# Patient Record
Sex: MALE | Race: WHITE | NOT HISPANIC OR LATINO | URBAN - METROPOLITAN AREA
[De-identification: names, ages, dates, MRNs, and addresses within clinical notes are randomized per-mention and may not be internally consistent; named-entity substitution may affect disease eponyms.]

---

## 2024-03-28 ENCOUNTER — OFFICE VISIT (OUTPATIENT)
Dept: PRIMARY CARE | Facility: CLINIC | Age: 24
End: 2024-03-28
Payer: COMMERCIAL

## 2024-03-28 VITALS
SYSTOLIC BLOOD PRESSURE: 134 MMHG | BODY MASS INDEX: 40.79 KG/M2 | HEIGHT: 69 IN | DIASTOLIC BLOOD PRESSURE: 60 MMHG | WEIGHT: 275.4 LBS

## 2024-03-28 DIAGNOSIS — L60.0 INGROWN NAIL OF GREAT TOE: ICD-10-CM

## 2024-03-28 PROBLEM — S61.219A LACERATION OF FINGER: Status: ACTIVE | Noted: 2024-03-28

## 2024-03-28 PROBLEM — H60.311 ACUTE DIFFUSE OTITIS EXTERNA OF RIGHT EAR: Status: ACTIVE | Noted: 2024-03-28

## 2024-03-28 PROBLEM — T78.40XA ALLERGIC REACTION: Status: ACTIVE | Noted: 2024-03-28

## 2024-03-28 PROBLEM — B09 VIRAL EXANTHEM: Status: ACTIVE | Noted: 2024-03-28

## 2024-03-28 PROBLEM — R21 RASH: Status: ACTIVE | Noted: 2024-03-28

## 2024-03-28 PROCEDURE — 99213 OFFICE O/P EST LOW 20 MIN: CPT | Performed by: INTERNAL MEDICINE

## 2024-03-28 PROCEDURE — 1036F TOBACCO NON-USER: CPT | Performed by: INTERNAL MEDICINE

## 2024-03-28 RX ORDER — DOXYCYCLINE 100 MG/1
100 CAPSULE ORAL 2 TIMES DAILY
Qty: 20 CAPSULE | Refills: 0 | Status: SHIPPED | OUTPATIENT
Start: 2024-03-28 | End: 2024-04-11 | Stop reason: SDUPTHER

## 2024-03-28 RX ORDER — CLINDAMYCIN HYDROCHLORIDE 300 MG/1
300 CAPSULE ORAL 2 TIMES DAILY
Qty: 20 CAPSULE | Refills: 0 | Status: SHIPPED | OUTPATIENT
Start: 2024-03-28 | End: 2024-04-12 | Stop reason: WASHOUT

## 2024-03-28 RX ORDER — BACITRACIN ZINC 500 UNIT/G
OINTMENT (GRAM) TOPICAL 2 TIMES DAILY
Qty: 14 G | Refills: 0 | Status: SHIPPED | OUTPATIENT
Start: 2024-03-28 | End: 2024-04-12 | Stop reason: WASHOUT

## 2024-03-28 ASSESSMENT — ENCOUNTER SYMPTOMS
OCCASIONAL FEELINGS OF UNSTEADINESS: 0
DEPRESSION: 0
LOSS OF SENSATION IN FEET: 0

## 2024-03-29 NOTE — PROGRESS NOTES
"Subjective   Patient ID: Lenard Rutherford is a 23 y.o. male who presents for New Patient Visit (Foot pain ).    This 23-year-old white man today came here for pain and swelling in both big toes, red, inflamed, tender.  Somehow he cut his nail too deep.  He is red, inflamed, tender, not feeling well.  Walking painful.  He had a surgery done in left big toe in the past and nail stopped growing.    PAST MEDICAL HISTORY: Pretty normal childhood.    IMMUNIZATION: Up to date.    I have personally reviewed the patient's Past Medical History, Medications, Allergies, Social History, and Family History in the EMR.    Review of Systems   All other systems reviewed and are negative.    Objective   /60   Ht 1.753 m (5' 9\")   Wt 125 kg (275 lb 6.4 oz)   BMI 40.67 kg/m²     Physical Exam  Vitals reviewed.   HENT:      Right Ear: Tympanic membrane, ear canal and external ear normal.      Left Ear: Tympanic membrane, ear canal and external ear normal.   Eyes:      General: No scleral icterus.     Pupils: Pupils are equal, round, and reactive to light.   Neck:      Vascular: No carotid bruit.   Cardiovascular:      Heart sounds: Normal heart sounds, S1 normal and S2 normal. No murmur heard.     No friction rub.   Pulmonary:      Effort: Pulmonary effort is normal.      Breath sounds: Normal breath sounds and air entry.   Abdominal:      Palpations: There is no hepatomegaly, splenomegaly or mass.   Genitourinary:     Comments: RECTAL: Deferred by the patient.  GENITAL: Deferred by the patient.  Musculoskeletal:         General: No swelling or deformity. Normal range of motion.      Cervical back: Neck supple.      Right lower leg: No edema.      Left lower leg: No edema.      Comments: Both toes big toe exam, ingrown toenail.  Unfortunately, both the sides granulation tissue on right edges.  Minimal abscess under the feet both.  Pulses good.  No sign of lymphangitis.  No calf tenderness.   Lymphadenopathy:      Cervical: No " cervical adenopathy.      Upper Body:      Right upper body: No axillary adenopathy.      Left upper body: No axillary adenopathy.      Lower Body: No right inguinal adenopathy. No left inguinal adenopathy.   Neurological:      Mental Status: He is oriented to person, place, and time.      Cranial Nerves: Cranial nerves 2-12 are intact. No cranial nerve deficit.      Sensory: No sensory deficit.      Motor: Motor function is intact. No weakness.      Gait: Gait is intact.      Deep Tendon Reflexes: Reflexes normal.   Psychiatric:         Mood and Affect: Mood normal. Mood is not anxious or depressed. Affect is not angry.         Behavior: Behavior is not agitated.         Thought Content: Thought content normal.         Judgment: Judgment normal.     LAB WORK: Laboratory testing discussed.    Assessment/Plan   Problem List Items Addressed This Visit    None  Visit Diagnoses         Codes    Ingrown nail of great toe     L60.0    Relevant Medications    doxycycline (Vibramycin) 100 mg capsule    bacitracin 500 unit/gram ointment    clindamycin (Cleocin) 300 mg capsule        1. Both toe nails, ingrown nail with infection.  I think over enthusiastic in shaving the nails.  Soak in hot water, Augmentin, doxycycline, bacitracin cream.  Explained correct method to encourage right healing.  2. I shall see him in a week to drain if necessary.  Happy to see him.  3. Welcome to my office and [facility].    Scribe Attestation  By signing my name below, IAna Lilia, Nadia attest that this documentation has been prepared under the direction and in the presence of Rachel Holden MD.

## 2024-04-02 ENCOUNTER — OFFICE VISIT (OUTPATIENT)
Dept: PRIMARY CARE | Facility: CLINIC | Age: 24
End: 2024-04-02
Payer: COMMERCIAL

## 2024-04-02 VITALS
SYSTOLIC BLOOD PRESSURE: 126 MMHG | HEIGHT: 69 IN | DIASTOLIC BLOOD PRESSURE: 64 MMHG | BODY MASS INDEX: 40.67 KG/M2 | WEIGHT: 274.6 LBS

## 2024-04-02 DIAGNOSIS — L02.612 ABSCESS, TOE, LEFT: Primary | ICD-10-CM

## 2024-04-02 PROCEDURE — 99213 OFFICE O/P EST LOW 20 MIN: CPT | Performed by: INTERNAL MEDICINE

## 2024-04-02 PROCEDURE — 10060 I&D ABSCESS SIMPLE/SINGLE: CPT | Performed by: INTERNAL MEDICINE

## 2024-04-02 RX ORDER — BACITRACIN 500 [USP'U]/G
OINTMENT TOPICAL 2 TIMES DAILY
COMMUNITY
Start: 2024-03-28 | End: 2024-04-11 | Stop reason: ALTCHOICE

## 2024-04-02 ASSESSMENT — ENCOUNTER SYMPTOMS
LOSS OF SENSATION IN FEET: 0
OCCASIONAL FEELINGS OF UNSTEADINESS: 0
DEPRESSION: 0

## 2024-04-03 NOTE — PROGRESS NOTES
"Subjective   Patient ID: Lenard Rutherford is a 23 y.o. male who presents for Left toe pain and swelling.    Mr. Lenard Rutherford today came here for pain and swelling in both big toes.  He has abscess.  He wants to drain.  He is taking antibiotic, feeling better.  Tetanus shot within last 10 years.  He came here for follow-up.    I have personally reviewed the patient's Past Medical History, Medications, Allergies, Social History, and Family History in the EMR.    Review of Systems   All other systems reviewed and are negative.    Objective   /64   Ht 1.753 m (5' 9\")   Wt 125 kg (274 lb 9.6 oz)   BMI 40.55 kg/m²     Physical Exam  Vitals reviewed.   Cardiovascular:      Heart sounds: Normal heart sounds, S1 normal and S2 normal. No murmur heard.     No friction rub.   Pulmonary:      Effort: Pulmonary effort is normal.      Breath sounds: Normal breath sounds and air entry.   Abdominal:      Palpations: There is no hepatomegaly, splenomegaly or mass.   Musculoskeletal:      Right lower leg: No edema.      Left lower leg: No edema.   Lymphadenopathy:      Lower Body: No right inguinal adenopathy. No left inguinal adenopathy.   Skin:     Comments: Left big toe red, inflamed, tender.  Abscess present on the medial border of the big toe.   Neurological:      Cranial Nerves: Cranial nerves 2-12 are intact.      Sensory: No sensory deficit.      Motor: Motor function is intact.      Deep Tendon Reflexes: Reflexes are normal and symmetric.     Incision and Drainage    Date/Time: 4/2/2024 10:05 PM    Performed by: Rachel Holden MD  Authorized by: Rachel Holden MD    Comments:      Under aseptic and antiseptic precautions, I did incision and drainage, at about 2 mL of pus came out, drained.  I put the nail bed up.  No need to reverse it.  Pulse okay.  No calf tenderness.  The patient followed by 20 minutes, no sign of any problem.    LAB WORK:  Laboratory testing discussed.    Assessment/Plan   Problem List Items Addressed " This Visit    None  Visit Diagnoses         Codes    Abscess, toe, left    -  Primary L02.612        Incision and drainage done and left toe abscess drain, antibiotic.  Abscess.  Continue the antibiotic, local cleaning explained.  Follow up in a week.  If situation gets worse, he will call me.  He has to wear a steel boot shoe at work.  I have advised him to keep it open.  Continue to monitor.    Scribe Attestation  By signing my name below, I, Nadia Bear attest that this documentation has been prepared under the direction and in the presence of Rachel Holden MD.

## 2024-04-11 ENCOUNTER — OFFICE VISIT (OUTPATIENT)
Dept: PRIMARY CARE | Facility: CLINIC | Age: 24
End: 2024-04-11
Payer: COMMERCIAL

## 2024-04-11 VITALS
BODY MASS INDEX: 40.14 KG/M2 | HEIGHT: 69 IN | DIASTOLIC BLOOD PRESSURE: 72 MMHG | WEIGHT: 271 LBS | SYSTOLIC BLOOD PRESSURE: 126 MMHG

## 2024-04-11 DIAGNOSIS — L60.0 INGROWN NAIL OF GREAT TOE: ICD-10-CM

## 2024-04-11 PROCEDURE — 10060 I&D ABSCESS SIMPLE/SINGLE: CPT | Performed by: INTERNAL MEDICINE

## 2024-04-11 RX ORDER — DOXYCYCLINE 100 MG/1
100 CAPSULE ORAL 2 TIMES DAILY
Qty: 20 CAPSULE | Refills: 0 | Status: SHIPPED | OUTPATIENT
Start: 2024-04-11 | End: 2024-04-21

## 2024-04-11 RX ORDER — CIPROFLOXACIN 500 MG/1
500 TABLET ORAL 2 TIMES DAILY
Qty: 20 TABLET | Refills: 0 | Status: SHIPPED | OUTPATIENT
Start: 2024-04-11 | End: 2024-04-21

## 2024-04-12 NOTE — PROGRESS NOTES
"Subjective   Patient ID: Lenard Rutherford is a 23 y.o. male who presents for Ingrown Toenail.    This gentleman today came here for pain and swelling in both the legs, ingrown toenail left is not completely better.  He still has abscess not completely gone.  Right is much better.  He came here for follow-up.  He finished antibiotics.    I have personally reviewed the patient's Past Medical History, Medications, Allergies, Social History, and Family History in the EMR.    Review of Systems   All other systems reviewed and are negative.    Objective   /72   Ht 1.753 m (5' 9\")   Wt 123 kg (271 lb)   BMI 40.02 kg/m²     Physical Exam  Vitals reviewed.   Cardiovascular:      Heart sounds: Normal heart sounds, S1 normal and S2 normal. No murmur heard.     No friction rub.   Pulmonary:      Effort: Pulmonary effort is normal.      Breath sounds: Normal breath sounds and air entry.   Abdominal:      Palpations: There is no hepatomegaly, splenomegaly or mass.   Musculoskeletal:      Right lower leg: No edema.      Left lower leg: No edema.      Comments: Right ingrown toenail looks good.  Left still has abscess  ______.  Dorsalis pedis okay.  No calf tenderness.   Lymphadenopathy:      Lower Body: No right inguinal adenopathy. No left inguinal adenopathy.   Neurological:      Cranial Nerves: Cranial nerves 2-12 are intact.      Sensory: No sensory deficit.      Motor: Motor function is intact.      Deep Tendon Reflexes: Reflexes are normal and symmetric.     PROCEDURE:  Under aseptic and antiseptic precautions, I cleaned out the left abscess and drained it, about 2 mL of pus and discharge, blood came out, ______.  The patient tolerated well.    LAB WORK:  Laboratory testing discussed.    Assessment/Plan   Problem List Items Addressed This Visit    None  Visit Diagnoses         Codes    Ingrown nail of great toe     L60.0    Relevant Medications    ciprofloxacin (Cipro) 500 mg tablet    doxycycline (Vibramycin) 100 mg " capsule        1. Paronychia, abscess under the nail bed.  Incision and drainage done.  Change antibiotics to Cipro and doxycycline.  Monitor.  Local wound care explained.  The patient tolerated the procedure well.  2.  Follow up in two weeks.    Scribe Attestation  By signing my name below, IJannie Scribe attest that this documentation has been prepared under the direction and in the presence of Rachel Holden MD.

## 2024-12-13 ENCOUNTER — OFFICE VISIT (OUTPATIENT)
Dept: URGENT CARE | Age: 24
End: 2024-12-13
Payer: COMMERCIAL

## 2024-12-13 VITALS
BODY MASS INDEX: 35 KG/M2 | DIASTOLIC BLOOD PRESSURE: 96 MMHG | OXYGEN SATURATION: 97 % | WEIGHT: 250 LBS | SYSTOLIC BLOOD PRESSURE: 138 MMHG | RESPIRATION RATE: 16 BRPM | HEIGHT: 71 IN | HEART RATE: 95 BPM | TEMPERATURE: 100.8 F

## 2024-12-13 DIAGNOSIS — J20.8 ACUTE BACTERIAL BRONCHITIS: Primary | ICD-10-CM

## 2024-12-13 DIAGNOSIS — B96.89 ACUTE BACTERIAL BRONCHITIS: Primary | ICD-10-CM

## 2024-12-13 RX ORDER — ALBUTEROL SULFATE 90 UG/1
2 INHALANT RESPIRATORY (INHALATION) EVERY 4 HOURS PRN
Qty: 6.7 G | Refills: 0 | Status: SHIPPED | OUTPATIENT
Start: 2024-12-13 | End: 2025-12-13

## 2024-12-13 RX ORDER — CLINDAMYCIN HYDROCHLORIDE 300 MG/1
300 CAPSULE ORAL 3 TIMES DAILY
Qty: 21 CAPSULE | Refills: 0 | Status: SHIPPED | OUTPATIENT
Start: 2024-12-13 | End: 2024-12-20

## 2024-12-13 RX ORDER — AZITHROMYCIN 250 MG/1
TABLET, FILM COATED ORAL
Qty: 6 TABLET | Refills: 0 | Status: SHIPPED | OUTPATIENT
Start: 2024-12-13 | End: 2024-12-18

## 2024-12-13 ASSESSMENT — PAIN SCALES - GENERAL: PAINLEVEL_OUTOF10: 0-NO PAIN

## 2024-12-13 NOTE — PROGRESS NOTES
Chief Complaint   Patient presents with    Fever     2 weeks    Cough     2 weeks    Shortness of Breath     Started Sunday         Physical Exam:     GEN: No acute distress    ENT: Bilateral TMs and canals unremarkable, sinus congestion present. Pharynx and tonsils mildly hyperemic but without exudate.     Resp: Lungs clear to auscultation bilaterally         Assessment:     Encounter Diagnosis   Name Primary?    Acute bacterial bronchitis Yes          Medical Decision Making & Plan:     Do not have xray services today, but will treat as a walking pneumonia with Clindamycin plus z pack    May return tomorrow for cxr        12/13/24 at 4:04 PM - Joanne Obrien, DO